# Patient Record
Sex: FEMALE | Race: WHITE | NOT HISPANIC OR LATINO | URBAN - METROPOLITAN AREA
[De-identification: names, ages, dates, MRNs, and addresses within clinical notes are randomized per-mention and may not be internally consistent; named-entity substitution may affect disease eponyms.]

---

## 2017-03-07 ENCOUNTER — APPOINTMENT (RX ONLY)
Dept: URBAN - METROPOLITAN AREA CLINIC 24 | Facility: CLINIC | Age: 27
Setting detail: DERMATOLOGY
End: 2017-03-07

## 2017-03-07 DIAGNOSIS — L81.0 POSTINFLAMMATORY HYPERPIGMENTATION: ICD-10-CM

## 2017-03-07 DIAGNOSIS — L94.0 LOCALIZED SCLERODERMA [MORPHEA]: ICD-10-CM

## 2017-03-07 PROBLEM — L30.9 DERMATITIS, UNSPECIFIED: Status: ACTIVE | Noted: 2017-03-07

## 2017-03-07 PROCEDURE — 99213 OFFICE O/P EST LOW 20 MIN: CPT

## 2017-03-07 PROCEDURE — ? TREATMENT REGIMEN

## 2017-03-07 PROCEDURE — ? PRESCRIPTION

## 2017-03-07 PROCEDURE — ? COUNSELING

## 2017-03-07 PROCEDURE — ? DEFER

## 2017-03-07 RX ORDER — CLOBETASOL PROPIONATE 0.5 MG/G
1 OINTMENT TOPICAL TID
Qty: 1 | Refills: 2 | Status: ERX

## 2017-03-07 ASSESSMENT — LOCATION ZONE DERM: LOCATION ZONE: NECK

## 2017-03-07 ASSESSMENT — LOCATION DETAILED DESCRIPTION DERM: LOCATION DETAILED: LEFT INFERIOR POSTERIOR NECK

## 2017-03-07 ASSESSMENT — LOCATION SIMPLE DESCRIPTION DERM: LOCATION SIMPLE: POSTERIOR NECK

## 2017-03-07 NOTE — PROCEDURE: TREATMENT REGIMEN
Plan: - DDX includes morphea vs. extra genital LSA \\n-strongly recommend biopsy. Patient states she is considering a biopsy but is afraid due to needle phobia \\n-discussed that a biopsy will be that next step to treatment \\n-strongly advised patient to treat with clobetasol BID x 2 weeks, then Protopic BID x 2 week, Repeat.\\n-follow up in 2 months and do Bx then \\n-Pt not compliant with Rx beyond 2 wks, so ok to do above and reassess\\n-Denies any joint pains, muscle tightness\\n-Photo taken today
Detail Level: Zone
Continue Regimen: Alternating Clobetasol for 2 weeks and  Protopic 2 weeks for two month

## 2017-03-07 NOTE — PROCEDURE: DEFER
Instructions (Optional): If patient has not improved by next visit, strongly suggest a biopsy
Procedure To Be Performed At Next Visit: Biopsy by punch method
Detail Level: Simple